# Patient Record
Sex: MALE | Race: WHITE | NOT HISPANIC OR LATINO | URBAN - METROPOLITAN AREA
[De-identification: names, ages, dates, MRNs, and addresses within clinical notes are randomized per-mention and may not be internally consistent; named-entity substitution may affect disease eponyms.]

---

## 2018-11-29 PROBLEM — 414916001 OBESITY: Status: ACTIVE | Noted: 2018-11-29

## 2018-11-29 PROBLEM — 68496003 POLYP OF COLON: Status: ACTIVE | Noted: 2018-11-29

## 2020-09-30 ENCOUNTER — APPOINTMENT (RX ONLY)
Dept: URBAN - METROPOLITAN AREA CLINIC 12 | Facility: CLINIC | Age: 70
Setting detail: DERMATOLOGY
End: 2020-09-30

## 2020-09-30 DIAGNOSIS — Z41.1 ENCOUNTER FOR COSMETIC SURGERY: ICD-10-CM

## 2020-09-30 PROBLEM — C40.20: Status: ACTIVE | Noted: 2020-09-30

## 2020-09-30 PROCEDURE — ? PHOTOS OBTAINED

## 2020-09-30 PROCEDURE — ? COUNSELING EXCISION - MALIGNANT

## 2020-09-30 PROCEDURE — ? PATIENT SPECIFIC COUNSELING

## 2020-09-30 PROCEDURE — 99203 OFFICE O/P NEW LOW 30 MIN: CPT

## 2020-09-30 PROCEDURE — ? PRE-OP WORKLIST

## 2020-09-30 ASSESSMENT — LOCATION SIMPLE DESCRIPTION DERM: LOCATION SIMPLE: LEFT CALF

## 2020-09-30 ASSESSMENT — LOCATION DETAILED DESCRIPTION DERM: LOCATION DETAILED: LEFT PROXIMAL CALF

## 2020-09-30 ASSESSMENT — LOCATION ZONE DERM: LOCATION ZONE: LEG

## 2020-09-30 NOTE — HPI: LOWER EXTREMITY SOFT TISSUE SARCOMA
Is This A New Presentation, Or A Follow-Up?: Lower Extremity Soft Tissue Sarcoma
Scale Of 0 To 10?: 0
Name Of Oncologist Doctor?: Dr. Carlson

## 2020-09-30 NOTE — PROCEDURE: PATIENT SPECIFIC COUNSELING
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 Patient referred by Dr. Carlson. Dr. Jay evaluated the lesion and explained that his job is to close him up after Aldo removes the cancer. Explained that he will likely have to do a skin graft to cover the opening. Explained that he will go home with a wound vac which is designed to help the graft take and heal well. Explained that he will need to take it easy for about 2 weeks with very minimal activity.
Detail Level: Zone

## 2020-09-30 NOTE — PROCEDURE: COUNSELING EXCISION - MALIGNANT
Add Postop Global No-Charge Code (28611)?: no
X Size Of Lesion In Cm (Optional): 0
Detail Level: Detailed

## 2020-09-30 NOTE — PROCEDURE: PRE-OP WORKLIST
Detail Level: Simple
Photos Taken?: yes
Surgeon: Dr. Jay
Date Of Surgery: tbd
Surgery Scheduled: Left leg reconstruction with STSG vs. possible muscle flap
Coordination With:: Aldo

## 2020-10-19 ENCOUNTER — APPOINTMENT (RX ONLY)
Dept: URBAN - METROPOLITAN AREA CLINIC 12 | Facility: CLINIC | Age: 70
Setting detail: DERMATOLOGY
End: 2020-10-19

## 2020-10-19 DIAGNOSIS — Z48.89 ENCOUNTER FOR OTHER SPECIFIED SURGICAL AFTERCARE: ICD-10-CM

## 2020-10-19 PROCEDURE — ? POST-OP CHECK

## 2020-10-19 PROCEDURE — ? PATIENT SPECIFIC COUNSELING

## 2020-10-19 PROCEDURE — ? VAC THERAPY < 50 SQ CM

## 2020-10-19 PROCEDURE — 99024 POSTOP FOLLOW-UP VISIT: CPT

## 2020-10-19 PROCEDURE — 97607 NEG PRS WND THR NDME<=50SQCM: CPT

## 2020-10-19 PROCEDURE — ? COUNSELING - POST-OP CHECK, SKIN GRAFT

## 2020-10-19 ASSESSMENT — LOCATION ZONE DERM: LOCATION ZONE: LEG

## 2020-10-19 ASSESSMENT — LOCATION DETAILED DESCRIPTION DERM: LOCATION DETAILED: RIGHT DISTAL CALF

## 2020-10-19 ASSESSMENT — LOCATION SIMPLE DESCRIPTION DERM: LOCATION SIMPLE: RIGHT CALF

## 2020-10-19 NOTE — PROCEDURE: PATIENT SPECIFIC COUNSELING
Detail Level: Zone
Other (Free Text): Patient present s/p skin graft on his right calf. Performed 10/13/2020. Patient reports doing well  with no concerns. 1 more week on wound vac. Patient will return every 2 days for vac change. Will remove wound vac on Friday. Patient advised to continue wearing his knee brace. Patient was educated on removing that tape from the donor site and how to treat the skin. Patient verbalized understanding.

## 2020-10-21 ENCOUNTER — APPOINTMENT (RX ONLY)
Dept: URBAN - METROPOLITAN AREA CLINIC 12 | Facility: CLINIC | Age: 70
Setting detail: DERMATOLOGY
End: 2020-10-21

## 2020-10-21 DIAGNOSIS — Z48.89 ENCOUNTER FOR OTHER SPECIFIED SURGICAL AFTERCARE: ICD-10-CM

## 2020-10-21 PROCEDURE — ? COUNSELING - POST-OP CHECK, SKIN GRAFT

## 2020-10-21 PROCEDURE — ? POST-OP CHECK

## 2020-10-21 PROCEDURE — ? PATIENT SPECIFIC COUNSELING

## 2020-10-21 PROCEDURE — 99024 POSTOP FOLLOW-UP VISIT: CPT

## 2020-10-21 ASSESSMENT — LOCATION SIMPLE DESCRIPTION DERM: LOCATION SIMPLE: RIGHT CALF

## 2020-10-21 ASSESSMENT — LOCATION ZONE DERM: LOCATION ZONE: LEG

## 2020-10-21 ASSESSMENT — LOCATION DETAILED DESCRIPTION DERM: LOCATION DETAILED: RIGHT DISTAL CALF

## 2020-10-21 NOTE — PROCEDURE: PATIENT SPECIFIC COUNSELING
Detail Level: Zone
Other (Free Text): Patient present s/p skin graft on his right calf. Performed 10/13/2020. Patient reports doing well  with no concerns. Wound vac was removed today. Patient advised to begin dressing the graft with ointment and non stick gauze. Will return next Monday or Tuesday for possible staple removal.

## 2020-10-27 ENCOUNTER — APPOINTMENT (RX ONLY)
Dept: URBAN - METROPOLITAN AREA CLINIC 12 | Facility: CLINIC | Age: 70
Setting detail: DERMATOLOGY
End: 2020-10-27

## 2020-10-27 DIAGNOSIS — Z48.89 ENCOUNTER FOR OTHER SPECIFIED SURGICAL AFTERCARE: ICD-10-CM

## 2020-10-27 PROCEDURE — 99024 POSTOP FOLLOW-UP VISIT: CPT

## 2020-10-27 PROCEDURE — ? PATIENT SPECIFIC COUNSELING

## 2020-10-27 PROCEDURE — ? POST-OP CHECK

## 2020-10-27 PROCEDURE — ? STAPLE REMOVAL

## 2020-10-27 PROCEDURE — ? COUNSELING - POST-OP CHECK, SKIN GRAFT

## 2020-10-27 ASSESSMENT — LOCATION DETAILED DESCRIPTION DERM
LOCATION DETAILED: RIGHT DISTAL CALF
LOCATION DETAILED: RIGHT PROXIMAL CALF

## 2020-10-27 ASSESSMENT — LOCATION ZONE DERM: LOCATION ZONE: LEG

## 2020-10-27 ASSESSMENT — LOCATION SIMPLE DESCRIPTION DERM: LOCATION SIMPLE: RIGHT CALF

## 2020-10-27 NOTE — PROCEDURE: PATIENT SPECIFIC COUNSELING
Detail Level: Zone
Other (Free Text): Patient present s/p skin graft on his right calf. Performed 10/13/2020. Patient reports no concerns or issues, patient reports he feels amazing with no pain. Dr. Jay examined patient and reports patient graft  is about 80% healed, patients skin graft looks really good, and patient advised to leave donor site alone, continue to keep leg covered while in shower, not letting water come in to direct contact with graft. Recommended patient to wear the knee stabilizer at night and keeping the compression on throughout the day with a gauze to protect the graft. DAVID Flores removed Staples today. Patient verbalized understanding and photos were obtained. Patient advised to follow up in 1 month.

## 2020-11-30 ENCOUNTER — APPOINTMENT (RX ONLY)
Dept: URBAN - METROPOLITAN AREA CLINIC 12 | Facility: CLINIC | Age: 70
Setting detail: DERMATOLOGY
End: 2020-11-30

## 2020-11-30 DIAGNOSIS — Z48.89 ENCOUNTER FOR OTHER SPECIFIED SURGICAL AFTERCARE: ICD-10-CM

## 2020-11-30 PROCEDURE — ? STAPLE REMOVAL

## 2020-11-30 PROCEDURE — ? COUNSELING - POST-OP CHECK, SKIN GRAFT

## 2020-11-30 PROCEDURE — 99024 POSTOP FOLLOW-UP VISIT: CPT

## 2020-11-30 PROCEDURE — ? PATIENT SPECIFIC COUNSELING

## 2020-11-30 PROCEDURE — ? POST-OP CHECK

## 2020-11-30 ASSESSMENT — LOCATION DETAILED DESCRIPTION DERM
LOCATION DETAILED: RIGHT PROXIMAL CALF
LOCATION DETAILED: RIGHT DISTAL CALF

## 2020-11-30 ASSESSMENT — LOCATION ZONE DERM: LOCATION ZONE: LEG

## 2020-11-30 ASSESSMENT — LOCATION SIMPLE DESCRIPTION DERM: LOCATION SIMPLE: RIGHT CALF

## 2020-11-30 NOTE — PROCEDURE: PATIENT SPECIFIC COUNSELING
Detail Level: Zone
Other (Free Text): Patient present s/p skin graft on his right calf. Performed 10/13/2020. Patient reports no concerns or issues. Dr. Jay debrided the scaly skin and advised patient to start cleaning his leg in the shower. Explained there is a small spot that still needs to heal, and advised to continue applying ointment and nonstick gauze.

## 2020-12-09 ENCOUNTER — OFFICE VISIT (OUTPATIENT)
Dept: URBAN - METROPOLITAN AREA CLINIC 27 | Facility: CLINIC | Age: 70
End: 2020-12-09

## 2021-01-04 ENCOUNTER — APPOINTMENT (RX ONLY)
Dept: URBAN - METROPOLITAN AREA CLINIC 12 | Facility: CLINIC | Age: 71
Setting detail: DERMATOLOGY
End: 2021-01-04

## 2021-01-04 DIAGNOSIS — Z48.89 ENCOUNTER FOR OTHER SPECIFIED SURGICAL AFTERCARE: ICD-10-CM

## 2021-01-04 PROCEDURE — ? COUNSELING - POST-OP CHECK, SKIN GRAFT

## 2021-01-04 PROCEDURE — ? PATIENT SPECIFIC COUNSELING

## 2021-01-04 PROCEDURE — 99024 POSTOP FOLLOW-UP VISIT: CPT

## 2021-01-04 PROCEDURE — ? POST-OP CHECK

## 2021-01-04 ASSESSMENT — LOCATION DETAILED DESCRIPTION DERM: LOCATION DETAILED: RIGHT DISTAL CALF

## 2021-01-04 ASSESSMENT — LOCATION ZONE DERM: LOCATION ZONE: LEG

## 2021-01-04 ASSESSMENT — LOCATION SIMPLE DESCRIPTION DERM: LOCATION SIMPLE: RIGHT CALF

## 2021-01-04 NOTE — PROCEDURE: PATIENT SPECIFIC COUNSELING
Detail Level: Zone
Other (Free Text): Patient present s/p skin graft on his right calf. Performed 10/13/2020. Patient reports no concerns or issues, doing great, has been playing a lot of golf. Dr. Jay examined patient and reports patients skin graft has fully healed and looks great. Patient may apply moisturizer to the donor site. Overall the skin will take a year or two to soften up and fill in. Recommended patient to use Silicone gel sheeting to area for improvement on pigmentation and thickened skin. Dr. Jay discharged patient today from any further follow up visits. Patient verbalized understanding and photos were obtained. Patient advised to RTC PRN.

## 2022-04-30 ENCOUNTER — TELEPHONE ENCOUNTER (OUTPATIENT)
Dept: URBAN - METROPOLITAN AREA CLINIC 121 | Facility: CLINIC | Age: 72
End: 2022-04-30

## 2022-05-01 ENCOUNTER — TELEPHONE ENCOUNTER (OUTPATIENT)
Dept: URBAN - METROPOLITAN AREA CLINIC 121 | Facility: CLINIC | Age: 72
End: 2022-05-01

## 2022-05-01 RX ORDER — AMLODIPINE BESYLATE 5 MG/1
TABLET ORAL
Status: ACTIVE | COMMUNITY
Start: 2018-11-29

## 2022-05-01 RX ORDER — ASPIRIN 81 MG/1
TABLET, DELAYED RELEASE ORAL
Status: ACTIVE | COMMUNITY
Start: 2018-11-29

## 2022-05-01 RX ORDER — ATORVASTATIN CALCIUM 40 MG/1
TABLET, FILM COATED ORAL
Status: ACTIVE | COMMUNITY
Start: 2018-11-29

## 2022-05-01 RX ORDER — CETIRIZINE HYDROCHLORIDE 10 MG/1
CAPSULE, LIQUID FILLED ORAL
Status: ACTIVE | COMMUNITY
Start: 2018-11-29

## 2022-05-01 RX ORDER — TAMSULOSIN HYDROCHLORIDE 0.4 MG/1
CAPSULE ORAL
Status: ACTIVE | COMMUNITY
Start: 2018-11-29

## 2022-05-01 RX ORDER — TADALAFIL 5 MG/1
TABLET, FILM COATED ORAL
Status: ACTIVE | COMMUNITY
Start: 2018-11-29

## 2022-05-01 RX ORDER — DICYCLOMINE HYDROCHLORIDE 10 MG/1
1 CAPSULE PO TID PRN SPASM CAPSULE ORAL
Status: ACTIVE | COMMUNITY
Start: 2020-12-09

## 2022-05-01 RX ORDER — NEBIVOLOL HYDROCHLORIDE 5 MG/1
TABLET ORAL
Status: ACTIVE | COMMUNITY
Start: 2018-11-29

## 2022-05-01 RX ORDER — LOSARTAN POTASSIUM 50 MG/1
TABLET, FILM COATED ORAL
Status: ACTIVE | COMMUNITY
Start: 2018-11-29